# Patient Record
Sex: FEMALE | Race: WHITE | NOT HISPANIC OR LATINO | Employment: FULL TIME | RURAL
[De-identification: names, ages, dates, MRNs, and addresses within clinical notes are randomized per-mention and may not be internally consistent; named-entity substitution may affect disease eponyms.]

---

## 2020-12-17 ENCOUNTER — HISTORICAL (OUTPATIENT)
Dept: ADMINISTRATIVE | Facility: HOSPITAL | Age: 30
End: 2020-12-17

## 2020-12-17 LAB
25(OH)D3 SERPL-MCNC: 19.6 NG/ML
ALBUMIN SERPL BCP-MCNC: 3.7 G/DL (ref 3.5–5)
ALBUMIN/GLOB SERPL: 0.9 {RATIO}
ALP SERPL-CCNC: 81 U/L (ref 37–98)
ALT SERPL W P-5'-P-CCNC: 28 U/L (ref 13–56)
ANION GAP SERPL CALCULATED.3IONS-SCNC: 10 MMOL/L (ref 7–16)
AST SERPL W P-5'-P-CCNC: 19 U/L (ref 15–37)
BASOPHILS # BLD AUTO: 0.04 X10E3/UL (ref 0–0.2)
BASOPHILS NFR BLD AUTO: 0.6 % (ref 0–1)
BILIRUB SERPL-MCNC: 0.2 MG/DL (ref 0–1.2)
BUN SERPL-MCNC: 12 MG/DL (ref 7–18)
BUN/CREAT SERPL: 12
CALCIUM SERPL-MCNC: 8.6 MG/DL (ref 8.5–10.1)
CHLORIDE SERPL-SCNC: 107 MMOL/L (ref 98–107)
CHOLEST SERPL-MCNC: 236 MG/DL
CHOLEST/HDLC SERPL: 5.5 {RATIO}
CO2 SERPL-SCNC: 26 MMOL/L (ref 21–32)
CREAT SERPL-MCNC: 1.01 MG/DL (ref 0.5–1.02)
EOSINOPHIL # BLD AUTO: 0.14 X10E3/UL (ref 0–0.5)
EOSINOPHIL NFR BLD AUTO: 2.2 % (ref 1–4)
ERYTHROCYTE [DISTWIDTH] IN BLOOD BY AUTOMATED COUNT: 11.8 % (ref 11.5–14.5)
EST. AVERAGE GLUCOSE BLD GHB EST-MCNC: 94 MG/DL
GLOBULIN SER-MCNC: 3.9 G/DL (ref 2–4)
GLUCOSE SERPL-MCNC: 107 MG/DL (ref 74–106)
HBA1C MFR BLD HPLC: 5.4 %
HCT VFR BLD AUTO: 41.1 % (ref 38–47)
HDLC SERPL-MCNC: 43 MG/DL
HGB BLD-MCNC: 14 G/DL (ref 12–16)
IMM GRANULOCYTES # BLD AUTO: 0.01 X10E3/UL (ref 0–0.04)
IMM GRANULOCYTES NFR BLD: 0.2 % (ref 0–0.4)
LDLC SERPL CALC-MCNC: 161 MG/DL
LYMPHOCYTES # BLD AUTO: 1.86 X10E3/UL (ref 1–4.8)
LYMPHOCYTES NFR BLD AUTO: 28.9 % (ref 27–41)
MCH RBC QN AUTO: 28.5 PG (ref 27–31)
MCHC RBC AUTO-ENTMCNC: 34.1 G/DL (ref 32–36)
MCV RBC AUTO: 83.7 FL (ref 80–96)
MONOCYTES # BLD AUTO: 0.73 X10E3/UL (ref 0–0.8)
MONOCYTES NFR BLD AUTO: 11.4 % (ref 2–6)
MPC BLD CALC-MCNC: 12.1 FL (ref 9.4–12.4)
NEUTROPHILS # BLD AUTO: 3.65 X10E3/UL (ref 1.8–7.7)
NEUTROPHILS NFR BLD AUTO: 56.7 % (ref 53–65)
NRBC # BLD AUTO: 0 X10E3/UL (ref 0–0)
NRBC, AUTO (.00): 0 /100 (ref 0–0)
PLATELET # BLD AUTO: 358 X10E3/UL (ref 150–400)
POTASSIUM SERPL-SCNC: 3.8 MMOL/L (ref 3.5–5.1)
PROT SERPL-MCNC: 7.6 G/DL (ref 6.4–8.2)
RBC # BLD AUTO: 4.91 X10E6/UL (ref 4.2–5.4)
SODIUM SERPL-SCNC: 139 MMOL/L (ref 136–145)
TRIGL SERPL-MCNC: 162 MG/DL
TSH SERPL DL<=0.005 MIU/L-ACNC: 0.81 UIU/ML (ref 0.36–3.74)
WBC # BLD AUTO: 6.43 X10E3/UL (ref 4.5–11)

## 2022-11-13 ENCOUNTER — OFFICE VISIT (OUTPATIENT)
Dept: FAMILY MEDICINE | Facility: CLINIC | Age: 32
End: 2022-11-13
Payer: COMMERCIAL

## 2022-11-13 VITALS
OXYGEN SATURATION: 98 % | HEART RATE: 94 BPM | TEMPERATURE: 97 F | HEIGHT: 66 IN | SYSTOLIC BLOOD PRESSURE: 125 MMHG | DIASTOLIC BLOOD PRESSURE: 80 MMHG | WEIGHT: 172 LBS | BODY MASS INDEX: 27.64 KG/M2

## 2022-11-13 DIAGNOSIS — H10.9 CONJUNCTIVITIS OF LEFT EYE, UNSPECIFIED CONJUNCTIVITIS TYPE: ICD-10-CM

## 2022-11-13 DIAGNOSIS — J06.9 UPPER RESPIRATORY TRACT INFECTION, UNSPECIFIED TYPE: Primary | ICD-10-CM

## 2022-11-13 PROCEDURE — 3074F PR MOST RECENT SYSTOLIC BLOOD PRESSURE < 130 MM HG: ICD-10-PCS | Mod: ,,, | Performed by: NURSE PRACTITIONER

## 2022-11-13 PROCEDURE — 3079F DIAST BP 80-89 MM HG: CPT | Mod: ,,, | Performed by: NURSE PRACTITIONER

## 2022-11-13 PROCEDURE — 3008F PR BODY MASS INDEX (BMI) DOCUMENTED: ICD-10-PCS | Mod: ,,, | Performed by: NURSE PRACTITIONER

## 2022-11-13 PROCEDURE — 3074F SYST BP LT 130 MM HG: CPT | Mod: ,,, | Performed by: NURSE PRACTITIONER

## 2022-11-13 PROCEDURE — 99202 OFFICE O/P NEW SF 15 MIN: CPT | Mod: ,,, | Performed by: NURSE PRACTITIONER

## 2022-11-13 PROCEDURE — 99051 MED SERV EVE/WKEND/HOLIDAY: CPT | Mod: ,,, | Performed by: NURSE PRACTITIONER

## 2022-11-13 PROCEDURE — 1159F PR MEDICATION LIST DOCUMENTED IN MEDICAL RECORD: ICD-10-PCS | Mod: ,,, | Performed by: NURSE PRACTITIONER

## 2022-11-13 PROCEDURE — 1160F PR REVIEW ALL MEDS BY PRESCRIBER/CLIN PHARMACIST DOCUMENTED: ICD-10-PCS | Mod: ,,, | Performed by: NURSE PRACTITIONER

## 2022-11-13 PROCEDURE — 1159F MED LIST DOCD IN RCRD: CPT | Mod: ,,, | Performed by: NURSE PRACTITIONER

## 2022-11-13 PROCEDURE — 99202 PR OFFICE/OUTPT VISIT, NEW, LEVL II, 15-29 MIN: ICD-10-PCS | Mod: ,,, | Performed by: NURSE PRACTITIONER

## 2022-11-13 PROCEDURE — 3079F PR MOST RECENT DIASTOLIC BLOOD PRESSURE 80-89 MM HG: ICD-10-PCS | Mod: ,,, | Performed by: NURSE PRACTITIONER

## 2022-11-13 PROCEDURE — 1160F RVW MEDS BY RX/DR IN RCRD: CPT | Mod: ,,, | Performed by: NURSE PRACTITIONER

## 2022-11-13 PROCEDURE — 3044F HG A1C LEVEL LT 7.0%: CPT | Mod: ,,, | Performed by: NURSE PRACTITIONER

## 2022-11-13 PROCEDURE — 3008F BODY MASS INDEX DOCD: CPT | Mod: ,,, | Performed by: NURSE PRACTITIONER

## 2022-11-13 PROCEDURE — 99051 PR MEDICAL SERVICES, EVE/WKEND/HOLIDAY: ICD-10-PCS | Mod: ,,, | Performed by: NURSE PRACTITIONER

## 2022-11-13 PROCEDURE — 3044F PR MOST RECENT HEMOGLOBIN A1C LEVEL <7.0%: ICD-10-PCS | Mod: ,,, | Performed by: NURSE PRACTITIONER

## 2022-11-13 RX ORDER — PREDNISONE 20 MG/1
20 TABLET ORAL DAILY
Qty: 5 TABLET | Refills: 0 | Status: SHIPPED | OUTPATIENT
Start: 2022-11-13

## 2022-11-13 RX ORDER — LEVONORGESTREL AND ETHINYL ESTRADIOL 0.1-0.02MG
1 KIT ORAL DAILY
COMMUNITY
Start: 2022-11-01

## 2022-11-13 RX ORDER — CIPROFLOXACIN HYDROCHLORIDE 3 MG/ML
1 SOLUTION/ DROPS OPHTHALMIC EVERY 4 HOURS
Qty: 2.5 ML | Refills: 0 | Status: SHIPPED | OUTPATIENT
Start: 2022-11-13 | End: 2022-11-18

## 2022-11-13 RX ORDER — CETIRIZINE HYDROCHLORIDE, PSEUDOEPHEDRINE HYDROCHLORIDE 5; 120 MG/1; MG/1
1 TABLET, FILM COATED, EXTENDED RELEASE ORAL 2 TIMES DAILY
Qty: 20 TABLET | Refills: 0 | Status: SHIPPED | OUTPATIENT
Start: 2022-11-13 | End: 2022-11-23

## 2022-11-13 NOTE — PROGRESS NOTES
[facility]    Chief Complaint      Chief Complaint   Patient presents with    Conjunctivitis    Nasal Congestion    Ear Fullness    Sore Throat    Headache    Documentation Only     Pink eye this am; everything else last week       History of Present Illness      Lyn Sanchez is a 32 y.o. female. She  has no past medical history on file. who presents today for possible pink eye that started this morning.  Also reports that she has URI type symptoms x1 week (does not want to be swabbed)    Past Medical History:  History reviewed. No pertinent past medical history.    Past Surgical History:   has no past surgical history on file.    Social History:  Social History     Tobacco Use    Smoking status: Never    Smokeless tobacco: Never       I personally reviewed all past medical, surgical, and social.     Review of Systems   Constitutional:  Negative for chills, fatigue and fever.   HENT:  Positive for congestion, ear pain and sore throat. Negative for rhinorrhea and sneezing.    Eyes:  Positive for pain, discharge and redness. Negative for itching and visual disturbance.   Respiratory:  Negative for cough and shortness of breath.    Gastrointestinal:  Negative for diarrhea, nausea and vomiting.   Musculoskeletal:  Negative for myalgias.   Skin:  Negative for rash.   Neurological:  Positive for headaches.      Medications:  Outpatient Encounter Medications as of 11/13/2022   Medication Sig Dispense Refill    AVIANE 0.1-20 mg-mcg per tablet Take 1 tablet by mouth once daily.      cetirizine-pseudoephedrine 5-120 mg Tb12 Take 1 tablet by mouth 2 (two) times a day. for 10 days 20 tablet 0    ciprofloxacin HCl (CILOXAN) 0.3 % ophthalmic solution Place 1 drop into the left eye every 4 (four) hours. for 5 days 2.5 mL 0    predniSONE (DELTASONE) 20 MG tablet Take 1 tablet (20 mg total) by mouth once daily. 5 tablet 0     No facility-administered encounter medications on file as of 11/13/2022.       Allergies:  Review  "of patient's allergies indicates:   Allergen Reactions    Latex        Health Maintenance:    There is no immunization history on file for this patient.   Health Maintenance   Topic Date Due    Hepatitis C Screening  Never done    TETANUS VACCINE  Never done    Lipid Panel  Completed        Physical Exam      Vital Signs  Temp: 96.9 °F (36.1 °C)  Temp src: Oral  Pulse: 94  SpO2: 98 %  BP: 125/80  Height and Weight  Height: 5' 6" (167.6 cm)  Weight: 78 kg (172 lb)  BSA (Calculated - sq m): 1.91 sq meters  BMI (Calculated): 27.8  Weight in (lb) to have BMI = 25: 154.6]    Physical Exam  Vitals and nursing note reviewed.   Constitutional:       General: She is not in acute distress.     Appearance: Normal appearance. She is well-developed.   HENT:      Head: Normocephalic.      Right Ear: Hearing normal.      Left Ear: Hearing normal.      Nose: Congestion present.   Eyes:      General: Lids are normal.         Left eye: Discharge present.     Extraocular Movements: Extraocular movements intact.      Conjunctiva/sclera:      Right eye: Right conjunctiva is injected. Exudate present.      Pupils: Pupils are equal, round, and reactive to light.   Cardiovascular:      Rate and Rhythm: Normal rate.   Pulmonary:      Effort: Pulmonary effort is normal. No respiratory distress.   Musculoskeletal:         General: Normal range of motion.      Cervical back: Normal range of motion and neck supple.   Skin:     General: Skin is warm and dry.   Neurological:      Mental Status: She is alert and oriented to person, place, and time.      Gait: Gait is intact.   Psychiatric:         Behavior: Behavior is cooperative.        Laboratory:  CBC:  Recent Labs   Lab 12/17/20  1540 04/14/22  1400   WBC 6.43 7.64   RBC 4.91 5.15   Hemoglobin 14.0 14.8   Hematocrit 41.1 44.0   Platelet Count 358 394   MCV 83.7 85.4   MCH 28.5 28.7   MCHC 34.1 33.6     CMP:  Recent Labs   Lab 12/17/20  1540 04/14/22  1400   Glucose 107 H 96   Calcium 8.6 9.6 "   Albumin 3.7 3.8   Total Protein 7.6 7.7   Sodium 139 136   Potassium 3.8 4.3   CO2 26 29   Chloride 107 102   BUN 12 13   Alk Phos 81 90   ALT 28 50   AST 19 24   Bilirubin, Total 0.2 0.3     LIPIDS:  Recent Labs   Lab 12/17/20  1540 04/14/22  1400   TSH 0.805 1.330   HDL Cholesterol 43 41   Cholesterol 236 223 H   Triglycerides 162 112   LDL Calculated 161 160   Cholesterol/HDL Ratio (Risk Factor) 5.5 5.4   Non-HDL  --  182     TSH:  Recent Labs   Lab 12/17/20  1540 04/14/22  1400   TSH 0.805 1.330     A1C:  Recent Labs   Lab 12/17/20  1540 04/14/22  1400   Hemoglobin A1C 5.4 5.3       Assessment/Plan     Lyn Sanchez is a 32 y.o.female with:     1. Upper respiratory tract infection, unspecified type  - predniSONE (DELTASONE) 20 MG tablet; Take 1 tablet (20 mg total) by mouth once daily.  Dispense: 5 tablet; Refill: 0  - cetirizine-pseudoephedrine 5-120 mg Tb12; Take 1 tablet by mouth 2 (two) times a day. for 10 days  Dispense: 20 tablet; Refill: 0    2. Conjunctivitis of left eye, unspecified conjunctivitis type  - ciprofloxacin HCl (CILOXAN) 0.3 % ophthalmic solution; Place 1 drop into the left eye every 4 (four) hours. for 5 days  Dispense: 2.5 mL; Refill: 0     Total time spent face-to-face and non-face-to-face coordinating care for this encounter was: 15 min    Chronic conditions status updated as per HPI.  Other than changes above, cont current medications and maintain follow up with specialists.  Return to clinic prn if symptoms worsen or fail to improve.    Tanesha Caldera, FNP  [facility]

## 2023-06-27 ENCOUNTER — OFFICE VISIT (OUTPATIENT)
Dept: DERMATOLOGY | Facility: CLINIC | Age: 33
End: 2023-06-27
Payer: COMMERCIAL

## 2023-06-27 DIAGNOSIS — L82.1 SK (SEBORRHEIC KERATOSIS): ICD-10-CM

## 2023-06-27 DIAGNOSIS — D48.9 NEOPLASM OF UNCERTAIN BEHAVIOR: ICD-10-CM

## 2023-06-27 DIAGNOSIS — D22.9 MULTIPLE BENIGN NEVI: ICD-10-CM

## 2023-06-27 DIAGNOSIS — L57.8 OTHER SKIN CHANGES DUE TO CHRONIC EXPOSURE TO NONIONIZING RADIATION: Primary | ICD-10-CM

## 2023-06-27 PROCEDURE — 88304 TISSUE EXAM BY PATHOLOGIST: CPT | Mod: 26,,, | Performed by: PATHOLOGY

## 2023-06-27 PROCEDURE — 3044F HG A1C LEVEL LT 7.0%: CPT | Mod: ,,, | Performed by: DERMATOLOGY

## 2023-06-27 PROCEDURE — 1159F MED LIST DOCD IN RCRD: CPT | Mod: ,,, | Performed by: DERMATOLOGY

## 2023-06-27 PROCEDURE — 88304 TISSUE EXAM BY PATHOLOGIST: CPT | Mod: TC,SUR | Performed by: DERMATOLOGY

## 2023-06-27 PROCEDURE — 99203 PR OFFICE/OUTPT VISIT, NEW, LEVL III, 30-44 MIN: ICD-10-PCS | Mod: 25,,, | Performed by: DERMATOLOGY

## 2023-06-27 PROCEDURE — 11102 PR TANGENTIAL BIOPSY, SKIN, SINGLE LESION: ICD-10-PCS | Mod: ,,, | Performed by: DERMATOLOGY

## 2023-06-27 PROCEDURE — 99203 OFFICE O/P NEW LOW 30 MIN: CPT | Mod: 25,,, | Performed by: DERMATOLOGY

## 2023-06-27 PROCEDURE — 1160F PR REVIEW ALL MEDS BY PRESCRIBER/CLIN PHARMACIST DOCUMENTED: ICD-10-PCS | Mod: ,,, | Performed by: DERMATOLOGY

## 2023-06-27 PROCEDURE — 3044F PR MOST RECENT HEMOGLOBIN A1C LEVEL <7.0%: ICD-10-PCS | Mod: ,,, | Performed by: DERMATOLOGY

## 2023-06-27 PROCEDURE — 11103 PR TANGENTIAL BIOPSY, SKIN, EA ADDTL LESION: ICD-10-PCS | Mod: ,,, | Performed by: DERMATOLOGY

## 2023-06-27 PROCEDURE — 11102 TANGNTL BX SKIN SINGLE LES: CPT | Mod: ,,, | Performed by: DERMATOLOGY

## 2023-06-27 PROCEDURE — 88304 PATHOLOGY, DERMATOLOGY: ICD-10-PCS | Mod: 26,,, | Performed by: PATHOLOGY

## 2023-06-27 PROCEDURE — 1160F RVW MEDS BY RX/DR IN RCRD: CPT | Mod: ,,, | Performed by: DERMATOLOGY

## 2023-06-27 PROCEDURE — 1159F PR MEDICATION LIST DOCUMENTED IN MEDICAL RECORD: ICD-10-PCS | Mod: ,,, | Performed by: DERMATOLOGY

## 2023-06-27 PROCEDURE — 11103 TANGNTL BX SKIN EA SEP/ADDL: CPT | Mod: ,,, | Performed by: DERMATOLOGY

## 2023-06-27 NOTE — PATIENT INSTRUCTIONS
Sunscreen Recommendations  I recommended a broad spectrum sunscreen with a SPF of 30 or higher that is water-resistant. SPF 30 sunscreens block approximately 97 percent of the sun's harmful rays.   Sunscreens should be applied at least 15 minutes prior to expected sun exposure and then every 90 minutes after that as long as sun exposure continues.   If swimming or exercising sunscreen should be reapplied every 45 minutes to an hour after getting wet or sweating.  One ounce, or the equivalent of a shot glass full of sunscreen, is adequate to protect the skin not covered by a bathing suit.   I also recommended a lip balm with a sunscreen as well.   Healthy Sun Protective Behaviors  Sun protective clothing can be used in lieu of sunscreen but must be worn the entire time you are exposed to the sun's rays.  Seek shade between 10 a.m. and 2 p.m.  Use extra caution near water, snow, or sand as they reflect sun rays  Avoid tanning beds and consider sunless self-tanning products instead  Perform monthly self skin exams  The ABCDEs of Melanoma  Asymmetry - when one side is unlike the other  Border - irregular  Color - different shades of colors that can be black, brown, tan, white, red, grey, or blue  Diameter - as big as or larger than the size of a pencil eraser (6mm)  Evolving - changing in size, color, or shape or stands out from the rest of your moles  Biopsy Site Care  Starting tomorrow you may shower and wash the area with dial antibacterial soap  Pat dry and apply vaseline and a bandaid  Perform this routine for three days  The area will be irritated, sore, slightly red, and may itch, sting, or burn  Do not apply make-up to the area until it is healed  There will be a scar  The area will take 1-2 weeks to heal  No soaking in the tub or hot tub for one week

## 2023-06-27 NOTE — PROGRESS NOTES
[facility]   Zara Roman MD    Patient Name: Lyn Sanchez  Patient YOB: 1990   Date of Service: 6/27/23    CC: Full Skin Exam    HPI: Lyn Sanchez is a 33 y.o. female presents today for a full skin exam.  Patient has been seen by a dermatologist in the past and dermatologic history includes no hx of skin cancer. Patient is concerned today about a lesions located on the neck.  It has been present for 5 year(s). It has been treated in the past.     History reviewed. No pertinent past medical history.  History reviewed. No pertinent surgical history.  Review of patient's allergies indicates:   Allergen Reactions    Latex        Current Outpatient Medications:     AVIANE 0.1-20 mg-mcg per tablet, Take 1 tablet by mouth once daily., Disp: , Rfl:     predniSONE (DELTASONE) 20 MG tablet, Take 1 tablet (20 mg total) by mouth once daily., Disp: 5 tablet, Rfl: 0    ROS: A focused review of systems was obtained and negative.     Exam: A full skin exam was performed including scalp, hair, face, neck, chest, back, abdomen, right arm, left arm, right hand, left hand, nails, right leg, and left leg.  All areas examined were normal expect as per below in assessment and plan.  General Appearance of the patient is well developed and well nourished.  Orientation: alert and oriented x 3.  Mood and affect: pleasant.    Assessment:   The primary encounter diagnosis was Other skin changes due to chronic exposure to nonionizing radiation. Diagnoses of SK (seborrheic keratosis), Neoplasm of uncertain behavior, and Multiple benign nevi were also pertinent to this visit.    Plan:      Other Skin Changes Due to Chronic Exposure of Nonionizing Radiation (L57.8)    Plan: Monitoring.     Plan: Sunscreen Recommendations.  I recommended a broad spectrum sunscreen with a SPF of 30 or higher. I explained that SPF 30 sunscreens block approximately 97 percent of the  sun's harmful rays. Sunscreens should be applied at least 15  minutes prior to expected sun exposure and then every 2 hours after that as long as  sun exposure continues. If swimming or exercising sunscreen should be reapplied every 45 minutes to an hour after getting wet or sweating. One  ounce, or the equivalent of a shot glass full of sunscreen, is adequate to protect the skin not covered by a bathing suit. I also recommended a lip  balm with a sunscreen as well. Sun protective clothing can be used in lieu of sunscreen but must be worn the entire time you are exposed to the  sun's rays.    Seborrheic Keratosis (L82.1)  - Stuck-on, warty, greasy brown papule with pseudo-horn cysts scattered on the trunk and extremities    Plan: Counseling.  I counseled the patient regarding the following:  Skin Care: Seborrheic Keratoses are benign. No treatment is necessary.  Expectations: Seborrheic Keratoses are benign warty growths. Patients get more of them as they age    Plan: Reassurance    Neoplasm of Uncertain Behavior (D48.5)  - pedunculated skin colored papules located on the right neck  Ddx includes: acrochordon    Plan: Biopsy by Shave Method.  Location (1): right neck  Written consent was obtained and risks were reviewed including but not  limited to scarring, infection, bleeding, scabbing, incomplete removal, nerve damage and allergy to anesthesia.  The area was prepped with Chloraprep. Local anesthesia was obtained with approximately 0.5cc of 1% lidocaine  with epinephrine. A biopsy by shave method to the level of the dermis (sent for H and E) was performed using  a Dermablade on the above location. Aluminum chloride was used for hemostasis. Following the biopsy  Petrolatum and a bandage were applied. Patient will be notified of biopsy results. However, patient instructed to  call the office if not contacted within 2 weeks.    - pedunculated skin colored papules located on the left neck  Ddx includes: acrochordon    Plan: Biopsy by Shave Method.  Location (2): left  neck  Written consent was obtained and risks were reviewed including but not  limited to scarring, infection, bleeding, scabbing, incomplete removal, nerve damage and allergy to anesthesia.  The area was prepped with Chloraprep. Local anesthesia was obtained with approximately 0.5cc of 1% lidocaine  with epinephrine. A biopsy by shave method to the level of the dermis (sent for H and E) was performed using  a Dermablade on the above location. Aluminum chloride was used for hemostasis. Following the biopsy  Petrolatum and a bandage were applied. Patient will be notified of biopsy results. However, patient instructed to  call the office if not contacted within 2 weeks.    Plan: Counseling.  I counseled the patient regarding the following:  Instructions: Neoplasms of Uncertain Behavior can be observed, biopsied or surgically removed depending on the  level of clinical suspicion.  Instructions: Neoplasms of Uncertain Behavior can be observed, biopsied or surgically removed depending on the  level of clinical suspicion.  Contact Office if: patient develops any new lesions that fail to heal, ulcerate or bleed.    Benign Nevus (D22.72)  - Dome shaped regular papule    Plan: Reassurance.    Plan: Counseling.  I counseled the patient regarding the following:  Instructions: Monthly self-skin checks to monitor for any changes in moles are recommended.  Expectations: Benign Nevi are pigmented nests of cells within the skin. No treatment is necessary.  Contact Office if: Any moles change in size, shape or color; itch, burn or bleed.      Follow up if symptoms worsen or fail to improve.    Zara Roman MD

## 2023-06-29 LAB
ESTROGEN SERPL-MCNC: NORMAL PG/ML
INSULIN SERPL-ACNC: NORMAL U[IU]/ML
LAB AP GROSS DESCRIPTION: NORMAL
LAB AP LABORATORY NOTES: NORMAL
LAB AP SPEC A DDX: NORMAL
LAB AP SPEC A MORPHOLOGY: NORMAL
LAB AP SPEC A PROCEDURE: NORMAL
LAB AP SPEC B DDX: NORMAL
LAB AP SPEC B MORPHOLOGY: NORMAL
LAB AP SPEC B PROCEDURE: NORMAL
T3RU NFR SERPL: NORMAL %

## 2025-06-13 RX ORDER — KETOCONAZOLE 20 MG/G
CREAM TOPICAL DAILY
Qty: 30 G | Refills: 2 | Status: SHIPPED | OUTPATIENT
Start: 2025-06-13